# Patient Record
Sex: MALE | Race: WHITE | URBAN - METROPOLITAN AREA
[De-identification: names, ages, dates, MRNs, and addresses within clinical notes are randomized per-mention and may not be internally consistent; named-entity substitution may affect disease eponyms.]

---
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
## 2018-01-15 ENCOUNTER — TELEPHONE (OUTPATIENT)
Dept: FAMILY MEDICINE CLINIC | Facility: CLINIC | Age: 18
End: 2018-01-15

## 2018-01-15 NOTE — TELEPHONE ENCOUNTER
Patient called to make sure his appt was for 1pm today, however, his appt was already marked as a no -show as it was for 10:30am today not 1pm. I did reschedule his appt for 3pm tomorrow, Tuesday 1/16 and explained the office no show policy as well.  Daisy

## 2018-01-16 ENCOUNTER — OFFICE VISIT (OUTPATIENT)
Dept: FAMILY MEDICINE CLINIC | Facility: CLINIC | Age: 18
End: 2018-01-16

## 2018-01-16 VITALS
BODY MASS INDEX: 24.18 KG/M2 | HEIGHT: 69 IN | TEMPERATURE: 97 F | WEIGHT: 163.25 LBS | SYSTOLIC BLOOD PRESSURE: 128 MMHG | RESPIRATION RATE: 18 BRPM | DIASTOLIC BLOOD PRESSURE: 62 MMHG | HEART RATE: 80 BPM

## 2018-01-16 DIAGNOSIS — J45.20 MILD INTERMITTENT ASTHMA WITHOUT COMPLICATION: Primary | ICD-10-CM

## 2018-01-16 PROBLEM — J40 BRONCHITIS: Status: RESOLVED | Noted: 2017-11-17 | Resolved: 2018-01-16

## 2018-01-16 PROCEDURE — 99214 OFFICE O/P EST MOD 30 MIN: CPT | Performed by: FAMILY MEDICINE

## 2018-01-16 NOTE — PROGRESS NOTES
[address]  PROGRESS NOTE  Chief Complaint:   Patient presents with: Other: Pt needs medical history due enlisting      HPI:   This is a 21year old male coming in for review of his medical history.   He is interested in enlisting in the treatment and has had no subsequent problems associated with that. He did have BCG vaccine in [facility]. 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 No results found for this or any previous visit.     Past Medical History:   Diagnosis Date sputum. GASTROINTESTINAL:  Denies abdominal pain, nausea, vomiting, constipation, diarrhea, or blood in stool. MUSCULOSKELETAL:  Denies weakness, muscle aches, back pain, joint pain, swelling or stiffness.   NEUROLOGICAL:  Denies headache, seizures, dizzi negative, FROM. EXTREMITIES:  No edema, no cyanosis, no clubbing, FROM, 2+ dorsalis pedis pulses bilaterally. NEURO:  No deficit, normal gait, strength and tone, sensory intact, normal reflexes. ASSESSMENT AND PLAN:   1.  Mild intermittent asthma witho

## 2018-05-15 ENCOUNTER — OFFICE VISIT (OUTPATIENT)
Dept: FAMILY MEDICINE CLINIC | Facility: CLINIC | Age: 18
End: 2018-05-15

## 2018-05-15 VITALS
SYSTOLIC BLOOD PRESSURE: 108 MMHG | DIASTOLIC BLOOD PRESSURE: 64 MMHG | HEART RATE: 82 BPM | HEIGHT: 69 IN | BODY MASS INDEX: 22.13 KG/M2 | WEIGHT: 149.38 LBS | RESPIRATION RATE: 20 BRPM | TEMPERATURE: 97 F

## 2018-05-15 DIAGNOSIS — L08.9 INFECTED SEBACEOUS CYST: Primary | ICD-10-CM

## 2018-05-15 DIAGNOSIS — L72.3 INFECTED SEBACEOUS CYST: Primary | ICD-10-CM

## 2018-05-15 NOTE — PROGRESS NOTES
[facility] [place]  PROGRESS NOTE  Chief Complaint:   Patient presents with:  Bump: Underneath left armpit, painful      HPI:   This is a 19year old male coming in for a tender pimple-like bump on the underside of his left armpit.   He said it s throat.   INTEGUMENTARY: See HPI      EXAM:   /64 (BP Location: Right arm, Patient Position: Sitting, Cuff Size: adult)   Pulse 82   Temp 97.1 °F (36.2 °C) (Tympanic)   Resp 20   Ht 69\"   Wt 149 lb 6 oz   BMI 22.06 kg/m²  Estimated body mass index is MD  5/15/2018  2:53 PM

## 2018-06-05 ENCOUNTER — TELEPHONE (OUTPATIENT)
Dept: FAMILY MEDICINE CLINIC | Facility: CLINIC | Age: 18
End: 2018-06-05

## 2018-06-05 NOTE — TELEPHONE ENCOUNTER
Offered appt tomorrow 9:30 with Dr Дмитрий Martinez for cyst evaluation  And again on Saturday. Patient states He has to work both dates. States He will wait until 6/12.     Patient informed if Cysts become to bothersome before 6/12 appt  That He can go to [employer]

## 2018-06-12 ENCOUNTER — OFFICE VISIT (OUTPATIENT)
Dept: FAMILY MEDICINE CLINIC | Facility: CLINIC | Age: 18
End: 2018-06-12

## 2018-06-12 VITALS
TEMPERATURE: 97 F | HEIGHT: 69 IN | SYSTOLIC BLOOD PRESSURE: 120 MMHG | BODY MASS INDEX: 22.68 KG/M2 | HEART RATE: 76 BPM | RESPIRATION RATE: 18 BRPM | WEIGHT: 153.13 LBS | DIASTOLIC BLOOD PRESSURE: 78 MMHG

## 2018-06-12 DIAGNOSIS — L08.9 INFECTED SEBACEOUS CYST: Primary | ICD-10-CM

## 2018-06-12 DIAGNOSIS — L72.3 INFECTED SEBACEOUS CYST: Primary | ICD-10-CM

## 2018-06-12 PROCEDURE — 99213 OFFICE O/P EST LOW 20 MIN: CPT | Performed by: FAMILY MEDICINE

## 2018-06-12 NOTE — PROGRESS NOTES
[address]  PROGRESS NOTE  Chief Complaint:   Patient presents with:   Follow - Up: cyst under armpit - possible one near elbow      HPI:   This is a 19year old male coming in for follow-up on an infected sebaceous cyst in his left axill Ears, Nose, Throat:  Denies hearing loss, sneezing, congestion, runny nose or sore throat. INTEGUMENTARY:  Denies rashes, itching, skin lesion, or excessive skin dryness.   CARDIOVASCULAR:  Denies chest pain, chest pressure, chest discomfort, palpitations, there are 2 smaller sebaceous cysts that are very small and not tender or sore. They are not draining at this time. HEART:  Regular rate and rhythm, no murmurs, rubs or gallops. LUNGS: Clear to auscultation bilterally, no rales/rhonchi/wheezing.       AS

## 2018-06-15 ENCOUNTER — TELEPHONE (OUTPATIENT)
Dept: FAMILY MEDICINE CLINIC | Facility: CLINIC | Age: 18
End: 2018-06-15

## 2018-06-15 NOTE — TELEPHONE ENCOUNTER
Pt has ingrown hair was seen on 6/12 pt states it has gotten bigger and thinks its infected. Pt wanted appt today. I informed pt we have no appt today. Can schedule appt for tomorrow or can go to urgent care.

## 2018-06-15 NOTE — TELEPHONE ENCOUNTER
Left message for pt he can either drain the infected hair himself or  Call tomorrow and schedule appt to have it drained. Pt is not avail and wanted me to leave detail message.

## 2018-06-15 NOTE — TELEPHONE ENCOUNTER
He can drain this infected hair with either a razor blade or a needle. If not, he may come in tomorrow to see someone to have it drained.

## 2018-06-15 NOTE — TELEPHONE ENCOUNTER
R/C.   Patient will not be available til after 4:00, but said you can leave a detailed message on his voice mail.

## 2018-06-15 NOTE — TELEPHONE ENCOUNTER
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

## 2019-04-01 ENCOUNTER — TELEPHONE (OUTPATIENT)
Dept: FAMILY MEDICINE CLINIC | Facility: CLINIC | Age: 19
End: 2019-04-01

## 2019-04-01 NOTE — TELEPHONE ENCOUNTER
No showed for NP appointment - I left a message letting him know that he no showed for his appointment today and to call the office to reschedule. I also noted in the message that he would be charged a $40.00 no show fee for missing the appointment.

## 2019-08-24 ENCOUNTER — OFFICE VISIT (OUTPATIENT)
Dept: FAMILY MEDICINE CLINIC | Facility: CLINIC | Age: 19
End: 2019-08-24
Payer: COMMERCIAL

## 2019-08-24 ENCOUNTER — TELEPHONE (OUTPATIENT)
Dept: FAMILY MEDICINE CLINIC | Facility: CLINIC | Age: 19
End: 2019-08-24

## 2019-08-24 VITALS
HEART RATE: 70 BPM | HEIGHT: 69 IN | OXYGEN SATURATION: 97 % | DIASTOLIC BLOOD PRESSURE: 82 MMHG | BODY MASS INDEX: 22.81 KG/M2 | WEIGHT: 154 LBS | SYSTOLIC BLOOD PRESSURE: 140 MMHG | TEMPERATURE: 98 F

## 2019-08-24 DIAGNOSIS — F32.A DEPRESSION, UNSPECIFIED DEPRESSION TYPE: Primary | ICD-10-CM

## 2019-08-24 PROCEDURE — 99214 OFFICE O/P EST MOD 30 MIN: CPT | Performed by: NURSE PRACTITIONER

## 2019-08-24 NOTE — PROGRESS NOTES
Alice Flores is a 22year old male. Patient presents with: Anxiety      HPI:   Patient is here with complaints of depression.   States that he feels like he has had some depression his whole life–states he worries a lot, he has lost upon a lot of peo 6/4/2012   • Attention deficit disorder with hyperactivity 3/19/2010   • Nonspecific reaction to tuberculin skin test 6/7/2010   • Primary thrombocytopenia ([address] 7/17/2014   • Rickets, active 6/13/2005   • Tobacco use disorder 11/25/2013      Social Histor Prescriptions     Signed Prescriptions Disp Refills   • Sertraline HCl 50 MG Oral Tab 30 tablet 1     Sig: Take 1 tablet (50 mg total) by mouth daily. Imaging & Consults:  None    No follow-ups on file.   Patient Instructions   It is important to get

## 2019-08-24 NOTE — TELEPHONE ENCOUNTER
Patient states he is having Anxiety/Depression/Anger issues. States at work he will get upset/overwhelmed and will need to removed himself from situations. Wanting to discuss with someone today about counseling/medication.     Appt given 11:30 today w

## 2019-08-28 ENCOUNTER — TELEPHONE (OUTPATIENT)
Dept: FAMILY MEDICINE CLINIC | Facility: CLINIC | Age: 19
End: 2019-08-28

## 2019-08-28 PROBLEM — F41.1 GAD (GENERALIZED ANXIETY DISORDER): Status: ACTIVE | Noted: 2019-08-28

## 2019-08-28 NOTE — TELEPHONE ENCOUNTER
Patient states he started sertraline today. Is in the nurses office at work. States after taking the sertraline he feels he went into slowdown. Appt given today with  to discuss symtoms.   Isrrael Roblero, 08/28/19, 10:28 AM    Future appt:

## 2019-08-28 NOTE — TELEPHONE ENCOUNTER
Patient was put on Sertraline - was told it could possibly take up to 6 weeks for medication to kick in. Wants to know if it can kick in sooner/ or what can he do to make it kick in sooner.    OK to leave detailed message / answer on his voicemail since he

## 2019-10-07 RX ORDER — BUSPIRONE HYDROCHLORIDE 5 MG/1
5 TABLET ORAL 3 TIMES DAILY
Qty: 270 TABLET | Refills: 0 | Status: SHIPPED | OUTPATIENT
Start: 2019-10-07 | End: 2021-06-28

## 2019-10-08 ENCOUNTER — TELEPHONE (OUTPATIENT)
Dept: FAMILY MEDICINE CLINIC | Facility: CLINIC | Age: 19
End: 2019-10-08

## 2019-10-08 NOTE — TELEPHONE ENCOUNTER
was RF for Buspar sent into [facility] in [address] [place] [address] Street   ( this was requested on Monday 10/7 )  please advise

## 2019-10-21 ENCOUNTER — OFFICE VISIT (OUTPATIENT)
Dept: FAMILY MEDICINE CLINIC | Facility: CLINIC | Age: 19
End: 2019-10-21
Payer: COMMERCIAL

## 2019-10-21 VITALS
HEART RATE: 81 BPM | HEIGHT: 69 IN | TEMPERATURE: 98 F | WEIGHT: 164 LBS | OXYGEN SATURATION: 97 % | BODY MASS INDEX: 24.29 KG/M2 | DIASTOLIC BLOOD PRESSURE: 80 MMHG | RESPIRATION RATE: 18 BRPM | SYSTOLIC BLOOD PRESSURE: 136 MMHG

## 2019-10-21 DIAGNOSIS — Z11.3 SCREEN FOR STD (SEXUALLY TRANSMITTED DISEASE): Primary | ICD-10-CM

## 2019-10-21 PROCEDURE — 99213 OFFICE O/P EST LOW 20 MIN: CPT | Performed by: FAMILY MEDICINE

## 2019-10-21 NOTE — PROGRESS NOTES
[facility] [place]  PROGRESS NOTE  Chief Complaint:   Patient presents with:  STD      HPI:   This is a 22year old male presents to clinic for her STD screening. Patient currently does not have any symptoms.   Denies any history of STDs in the sinus pain or sore throat; hearing loss negative  INTEGUMENTARY:  Denies rashes, itching, skin lesion, or excessive skin dryness.   CARDIOVASCULAR:  Denies chest pain, chest pressure, chest discomfort, palpitations, edema, dyspnea on exertion or at rest.  R and all orders for this visit:    Screen for STD (sexually transmitted disease)  -     CHLAMYDIA/GONOCOCCUS, TERESE  -     T PALLIDUM SCREENING CASCADE  -     HIV AG AB COMBO  -     HEPATITIS B SURFACE ANTIBODY  -     HEPATITIS B SURFACE ANTIGEN  -     HCV AN

## 2019-10-21 NOTE — PATIENT INSTRUCTIONS
Check STD panel today. Discussed use of condoms on a regular basis. Return to clinic if any concern.

## 2020-02-24 ENCOUNTER — TELEPHONE (OUTPATIENT)
Dept: FAMILY MEDICINE CLINIC | Facility: CLINIC | Age: 20
End: 2020-02-24

## 2020-02-24 NOTE — TELEPHONE ENCOUNTER
Patient states he was at work today and went to bathroom for bm. States had quite a bit of blood on the toilet paper. Would like evaluation by .  has not openings today. Offered appt with Sharla VASQUEZ/P. Patient refused.     Advise

## 2020-04-21 ENCOUNTER — TELEPHONE (OUTPATIENT)
Dept: FAMILY MEDICINE CLINIC | Facility: CLINIC | Age: 20
End: 2020-04-21

## 2020-04-21 NOTE — TELEPHONE ENCOUNTER
Letter written specifying that Riwilmera Rack does have several risk factors for a more severe type of COVID infection.

## 2020-04-21 NOTE — TELEPHONE ENCOUNTER
works at [employer]  and needs note for work due to his high risk factors and they have had + COVID 19 cases

## 2020-04-21 NOTE — TELEPHONE ENCOUNTER
Patient states  advised patient not to work today and should remain home. Patient states he needs a letter to have on hand for his employer that he is at high risk for Covid19 due to:    IPP/ Antiphospholipid/ Asthma.     Patient is going to set up his

## 2020-04-22 ENCOUNTER — TELEPHONE (OUTPATIENT)
Dept: FAMILY MEDICINE CLINIC | Facility: CLINIC | Age: 20
End: 2020-04-22

## 2020-04-22 NOTE — TELEPHONE ENCOUNTER
Offered Video Appt today with . Patient stated can disregard phone message as he is going to wear a mask to work.     Roby Harman, 04/22/20, 12:56 PM

## 2020-06-30 ENCOUNTER — OFFICE VISIT (OUTPATIENT)
Dept: FAMILY MEDICINE CLINIC | Facility: CLINIC | Age: 20
End: 2020-06-30
Payer: COMMERCIAL

## 2020-06-30 VITALS
RESPIRATION RATE: 20 BRPM | OXYGEN SATURATION: 97 % | BODY MASS INDEX: 25.38 KG/M2 | WEIGHT: 171.38 LBS | DIASTOLIC BLOOD PRESSURE: 80 MMHG | HEART RATE: 80 BPM | TEMPERATURE: 97 F | HEIGHT: 69 IN | SYSTOLIC BLOOD PRESSURE: 120 MMHG
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
## 2020-06-30 DIAGNOSIS — T07.XXXA MULTIPLE ABRASIONS: ICD-10-CM

## 2020-06-30 PROCEDURE — 99213 OFFICE O/P EST LOW 20 MIN: CPT | Performed by: NURSE PRACTITIONER

## 2020-06-30 RX ORDER — HYDROCODONE BITARTRATE AND ACETAMINOPHEN 10; 325 MG/1; MG/1
1 TABLET ORAL EVERY 6 HOURS PRN
COMMUNITY
Start: 2020-06-25 | End: 2021-06-28

## 2020-06-30 NOTE — PATIENT INSTRUCTIONS
Check to see if you can find your immunization records  I do not see a recent tetanus. Also see if you finished Gardasil (HPV vaccine) -     Note given for work     Rest, ice, elevate, wrap, tylenol.   Cover abrasions with bacitracin ointment and bandag
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
## 2021-05-11 RX ORDER — ALBUTEROL SULFATE 90 UG/1
2 AEROSOL, METERED RESPIRATORY (INHALATION) AS NEEDED
Qty: 1 INHALER | Refills: 1 | Status: SHIPPED | OUTPATIENT
Start: 2021-05-11 | End: 2021-11-17

## 2021-05-11 NOTE — TELEPHONE ENCOUNTER
Future appt:    Last Appointment with provider:   Visit date not found  Last appointment at [facility]:  [facility] 6/2020      No results found for: CHOLEST, HDL, LDL, TRIGLY, TRIG  No results found for: EAG, A1C  No results found for: T4F, TSH, TSHT4    No

## 2021-05-11 NOTE — TELEPHONE ENCOUNTER
Pt calling for a refill on his albuterol inhaler to be sent to [facility] in [place]. Please advise.

## 2021-06-28 ENCOUNTER — OFFICE VISIT (OUTPATIENT)
Dept: FAMILY MEDICINE CLINIC | Facility: CLINIC | Age: 21
End: 2021-06-28
Payer: COMMERCIAL

## 2021-06-28 VITALS
DIASTOLIC BLOOD PRESSURE: 82 MMHG | OXYGEN SATURATION: 99 % | WEIGHT: 169 LBS | TEMPERATURE: 98 F | HEIGHT: 69 IN | HEART RATE: 87 BPM | SYSTOLIC BLOOD PRESSURE: 132 MMHG | BODY MASS INDEX: 25.03 KG/M2 | RESPIRATION RATE: 14 BRPM

## 2021-06-28 DIAGNOSIS — Z11.8 SCREENING FOR CHLAMYDIAL DISEASE: ICD-10-CM

## 2021-06-28 DIAGNOSIS — Z11.3 SCREENING FOR GONORRHEA: Primary | ICD-10-CM

## 2021-06-28 PROCEDURE — 3075F SYST BP GE 130 - 139MM HG: CPT | Performed by: NURSE PRACTITIONER

## 2021-06-28 PROCEDURE — 87591 N.GONORRHOEAE DNA AMP PROB: CPT | Performed by: NURSE PRACTITIONER

## 2021-06-28 PROCEDURE — 99213 OFFICE O/P EST LOW 20 MIN: CPT | Performed by: NURSE PRACTITIONER

## 2021-06-28 PROCEDURE — 87491 CHLMYD TRACH DNA AMP PROBE: CPT | Performed by: NURSE PRACTITIONER

## 2021-06-28 PROCEDURE — 3008F BODY MASS INDEX DOCD: CPT | Performed by: NURSE PRACTITIONER

## 2021-06-28 PROCEDURE — 3079F DIAST BP 80-89 MM HG: CPT | Performed by: NURSE PRACTITIONER

## 2021-06-28 RX ORDER — ALBUTEROL SULFATE 90 UG/1
2 AEROSOL, METERED RESPIRATORY (INHALATION) AS NEEDED
Qty: 1 EACH | Refills: 0 | Status: CANCELLED | OUTPATIENT
Start: 2021-06-28

## 2021-06-28 NOTE — PROGRESS NOTES
[facility] [place]  PROGRESS NOTE  Chief Complaint:   Patient presents with:  STD      HPI:   This is a 19year old male coming in for STD check. Patient here for gonorrhea and chlamydia check.   Patient reports his girlfriend was tested for b Denies hearing loss, sneezing, congestion, runny nose or sore throat. INTEGUMENTARY:  Denies rashes, itching, skin lesion, or excessive skin dryness.   CARDIOVASCULAR:  Denies chest pain, chest pressure, chest discomfort, palpitations, edema, dyspnea on e CHLAMYDIA/GONOCOCCUS, TERESE    2. Screening for chlamydial disease  Same as above. - CHLAMYDIA/GONOCOCCUS, TERESE      Patient/Caregiver Education: Patient/Caregiver Education: There are no barriers to learning. Medical education done.    Outcome: Patient verb

## 2021-06-28 NOTE — PATIENT INSTRUCTIONS
Urine specimen today, no sexua-l intercourse until results available and if positive both partners treated. Will await urine results, can take 48-72 hours to result.

## 2021-06-30 ENCOUNTER — TELEPHONE (OUTPATIENT)
Dept: FAMILY MEDICINE CLINIC | Facility: CLINIC | Age: 21
End: 2021-06-30

## 2021-06-30 DIAGNOSIS — A74.9 CHLAMYDIA INFECTION: Primary | ICD-10-CM

## 2021-06-30 RX ORDER — DOXYCYCLINE HYCLATE 100 MG/1
100 CAPSULE ORAL 2 TIMES DAILY
Qty: 20 CAPSULE | Refills: 0 | Status: SHIPPED | OUTPATIENT
Start: 2021-06-30 | End: 2021-07-10

## 2021-06-30 NOTE — TELEPHONE ENCOUNTER
----- Message from ZACKERY Gomez sent at 6/30/2021  9:16 AM CDT -----  Please call the patient with results. Patient positive for chlamydia on urine specimen. Please confirm patient pharmacy, indicated he may be travelling this week.   Will treat

## 2021-06-30 NOTE — TELEPHONE ENCOUNTER
Patient informed of the below results and recommendations. Patient would like prescription sent to [facility]. Patient will c/b to schedule a lab/nurse visit in 3 months. Form completed and faxed to [facility] as requested. No

## 2021-08-17 ENCOUNTER — TELEPHONE (OUTPATIENT)
Dept: FAMILY MEDICINE CLINIC | Facility: CLINIC | Age: 21
End: 2021-08-17

## 2021-11-01 ENCOUNTER — TELEPHONE (OUTPATIENT)
Dept: FAMILY MEDICINE CLINIC | Facility: CLINIC | Age: 21
End: 2021-11-01

## 2021-11-01 RX ORDER — BUSPIRONE HYDROCHLORIDE 5 MG/1
5 TABLET ORAL 3 TIMES DAILY
COMMUNITY
End: 2021-11-01

## 2021-11-01 NOTE — TELEPHONE ENCOUNTER
Patient states he restarted his Buspirone he has had since 2019. States he is feeling improvement of mood. Increased Anxiety/Depression since recent   Death of his father.     Patient is asking for  to send in  Rx for the Buspirone/Sertrali

## 2021-11-02 RX ORDER — BUSPIRONE HYDROCHLORIDE 5 MG/1
5 TABLET ORAL 3 TIMES DAILY
Qty: 270 TABLET | Refills: 0 | Status: SHIPPED | OUTPATIENT
Start: 2021-11-02 | End: 2021-11-17

## 2021-11-02 NOTE — TELEPHONE ENCOUNTER
Patient informed Rx sent to [facility]. Verbal permission given to call Mom to let know   Rx sent to [facility] [place]/done.   Dannie Guardado CMA, 11/02/21, 11:49 AM

## 2021-11-15 ENCOUNTER — TELEPHONE (OUTPATIENT)
Dept: FAMILY MEDICINE CLINIC | Facility: CLINIC | Age: 21
End: 2021-11-15

## 2021-11-15 NOTE — TELEPHONE ENCOUNTER
LM to RS no show. Informed pt of fee. \"Hello,    We see you missed your appointment that was scheduled for today. Just to let you know the system has charged you a $40.00 missed appointment fee.  As a reminder it is important to keep, cancel, or resc

## 2021-11-17 ENCOUNTER — OFFICE VISIT (OUTPATIENT)
Dept: FAMILY MEDICINE CLINIC | Facility: CLINIC | Age: 21
End: 2021-11-17
Payer: COMMERCIAL

## 2021-11-17 VITALS
TEMPERATURE: 99 F | HEIGHT: 69 IN | RESPIRATION RATE: 16 BRPM | WEIGHT: 169 LBS | SYSTOLIC BLOOD PRESSURE: 128 MMHG | HEART RATE: 95 BPM | DIASTOLIC BLOOD PRESSURE: 82 MMHG | BODY MASS INDEX: 25.03 KG/M2 | OXYGEN SATURATION: 97 %

## 2021-11-17 DIAGNOSIS — F41.1 GAD (GENERALIZED ANXIETY DISORDER): ICD-10-CM

## 2021-11-17 DIAGNOSIS — M25.571 CHRONIC PAIN OF RIGHT ANKLE: ICD-10-CM

## 2021-11-17 DIAGNOSIS — M25.50 ARTHRALGIA, UNSPECIFIED JOINT: ICD-10-CM

## 2021-11-17 DIAGNOSIS — R76.8 ANA POSITIVE: ICD-10-CM

## 2021-11-17 DIAGNOSIS — G89.29 CHRONIC PAIN OF RIGHT ANKLE: ICD-10-CM

## 2021-11-17 DIAGNOSIS — A74.9 CHLAMYDIA INFECTION: ICD-10-CM

## 2021-11-17 DIAGNOSIS — R06.2 WHEEZING: ICD-10-CM

## 2021-11-17 DIAGNOSIS — Z86.2 HISTORY OF ITP: Primary | ICD-10-CM

## 2021-11-17 PROCEDURE — 87591 N.GONORRHOEAE DNA AMP PROB: CPT | Performed by: NURSE PRACTITIONER

## 2021-11-17 PROCEDURE — 87491 CHLMYD TRACH DNA AMP PROBE: CPT | Performed by: NURSE PRACTITIONER

## 2021-11-17 PROCEDURE — 99215 OFFICE O/P EST HI 40 MIN: CPT | Performed by: NURSE PRACTITIONER

## 2021-11-17 PROCEDURE — 3008F BODY MASS INDEX DOCD: CPT | Performed by: NURSE PRACTITIONER

## 2021-11-17 PROCEDURE — 3074F SYST BP LT 130 MM HG: CPT | Performed by: NURSE PRACTITIONER

## 2021-11-17 PROCEDURE — 3079F DIAST BP 80-89 MM HG: CPT | Performed by: NURSE PRACTITIONER

## 2021-11-17 RX ORDER — BUSPIRONE HYDROCHLORIDE 5 MG/1
5 TABLET ORAL 3 TIMES DAILY
Qty: 270 TABLET | Refills: 0 | Status: SHIPPED | OUTPATIENT
Start: 2021-11-17 | End: 2022-01-31

## 2021-11-17 RX ORDER — ALBUTEROL SULFATE 90 UG/1
2 AEROSOL, METERED RESPIRATORY (INHALATION) EVERY 4 HOURS PRN
Qty: 1 EACH | Refills: 0 | Status: SHIPPED | OUTPATIENT
Start: 2021-11-17

## 2021-11-18 ENCOUNTER — TELEPHONE (OUTPATIENT)
Dept: FAMILY MEDICINE CLINIC | Facility: CLINIC | Age: 21
End: 2021-11-18

## 2021-11-18 NOTE — TELEPHONE ENCOUNTER
----- Message from ZACKERY Green sent at 11/18/2021  3:07 PM CST -----  Repeat urine for gonorrhea and chlamydia is negative.

## 2021-11-18 NOTE — PATIENT INSTRUCTIONS
Labs in next few days through [facility].    Refills of sertraline and buspirone, follow up in 3 months.     Physical therapy, [address] rehab for right ankle pain    Refill of albuterol for wheezing

## 2021-11-18 NOTE — PROGRESS NOTES
[facility] [place]  PROGRESS NOTE  Chief Complaint:   Patient presents with:  Chlamydia: Recheck Chlamydia   Joint Pain  Anxiety      HPI:   This is a 19year old male coming in for multiple symptoms. First, here for recheck on chlamydia.   No use: No    Social History    Social History Narrative      Not on file    Family History:  Family History   Family history unknown:  Yes     Allergies:    Latex                   RASH  Current Meds:  Current Outpatient Medications   Medication Sig Dispense cold or heat intolerance, polyuria or polydipsia. ALLERGIES:  Denies allergic response, history of asthma, sneezing, hives, eczema or rhinitis.      EXAM:   /82   Pulse 95   Temp 98.8 °F (37.1 °C) (Temporal)   Resp 16   Ht 5' 9\" (1.753 m)   Wt 169 l at this time  Declines counseling nor grief counseling  Medications managing symptoms currently, plan for recheck in 3-6 months pending lab results for other conditions  - busPIRone 5 MG Oral Tab; Take 1 tablet (5 mg total) by mouth 3 (three) times daily. Vaccine(1) due on 10/01/2021      Problem List:  Patient Active Problem List:     BEN positive     Asthma     Nonspecific reaction to tuberculin skin test     Tobacco use disorder     Contusion of neck     Infected sebaceous cyst     EDI (generalized anxie

## 2022-01-28 DIAGNOSIS — F41.1 GAD (GENERALIZED ANXIETY DISORDER): ICD-10-CM

## 2022-01-31 RX ORDER — BUSPIRONE HYDROCHLORIDE 5 MG/1
TABLET ORAL
Qty: 270 TABLET | Refills: 1 | Status: SHIPPED | OUTPATIENT
Start: 2022-01-31

## 2022-01-31 NOTE — TELEPHONE ENCOUNTER
Future appt:    Last Appointment with provider:   08/28/2019 - EDI  Last appointment at [facility]:  11/17/2021 - hx ITP - repeat labs in 3-6 months depending on results    Last px - never done      No results found for: CHOLEST, HDL, LDL, TRIGLY, TRIG

## 2022-09-10 ENCOUNTER — HOSPITAL ENCOUNTER (EMERGENCY)
Age: 22
Discharge: HOME | End: 2022-09-10

## 2022-09-10 VITALS
OXYGEN SATURATION: 98 % | HEIGHT: 70 IN | SYSTOLIC BLOOD PRESSURE: 146 MMHG | DIASTOLIC BLOOD PRESSURE: 84 MMHG | WEIGHT: 180 LBS | TEMPERATURE: 99 F | HEART RATE: 73 BPM | BODY MASS INDEX: 25.77 KG/M2 | RESPIRATION RATE: 20 BRPM

## 2022-09-10 DIAGNOSIS — R42 LIGHTHEADEDNESS: ICD-10-CM

## 2022-09-10 DIAGNOSIS — R42 DIZZINESS: ICD-10-CM

## 2022-09-10 DIAGNOSIS — R53.1 WEAKNESS GENERALIZED: Primary | ICD-10-CM

## 2022-09-10 DIAGNOSIS — F14.90 COCAINE USE: ICD-10-CM

## 2022-09-10 LAB
ALBUMIN SERPL-MCNC: 3.8 G/DL (ref 3.6–5.1)
ALBUMIN/GLOB SERPL: 1.7 {RATIO} (ref 1–2.4)
ALP SERPL-CCNC: 79 UNITS/L (ref 45–117)
ALT SERPL-CCNC: 39 UNITS/L
AMPHETAMINES UR QL SCN>500 NG/ML: NEGATIVE
ANION GAP SERPL CALC-SCNC: 13 MMOL/L (ref 7–19)
APPEARANCE UR: ABNORMAL
AST SERPL-CCNC: 17 UNITS/L
BACTERIA #/AREA URNS HPF: ABNORMAL /HPF
BARBITURATES UR QL SCN>200 NG/ML: NEGATIVE
BASOPHILS # BLD: 0 K/MCL (ref 0–0.3)
BASOPHILS NFR BLD: 0 %
BENZODIAZ UR QL SCN>200 NG/ML: NEGATIVE
BILIRUB SERPL-MCNC: 0.3 MG/DL (ref 0.2–1)
BILIRUB UR QL STRIP: NEGATIVE
BUN SERPL-MCNC: 13 MG/DL (ref 6–20)
BUN/CREAT SERPL: 14 (ref 7–25)
BZE UR QL SCN>150 NG/ML: POSITIVE
CALCIUM SERPL-MCNC: 8.7 MG/DL (ref 8.4–10.2)
CANNABINOIDS UR QL SCN>50 NG/ML: NEGATIVE
CHLORIDE SERPL-SCNC: 108 MMOL/L (ref 97–110)
CK SERPL-CCNC: 107 UNITS/L (ref 39–308)
CO2 SERPL-SCNC: 27 MMOL/L (ref 21–32)
COLOR UR: YELLOW
CREAT SERPL-MCNC: 0.95 MG/DL (ref 0.67–1.17)
DEPRECATED RDW RBC: 42.8 FL (ref 39–50)
EOSINOPHIL # BLD: 0 K/MCL (ref 0–0.5)
EOSINOPHIL NFR BLD: 1 %
ERYTHROCYTE [DISTWIDTH] IN BLOOD: 12.6 % (ref 11–15)
ETHANOL SERPL-MCNC: NORMAL MG/DL
FASTING DURATION TIME PATIENT: ABNORMAL H
GFR SERPLBLD BASED ON 1.73 SQ M-ARVRAT: >90 ML/MIN
GLOBULIN SER-MCNC: 2.2 G/DL (ref 2–4)
GLUCOSE SERPL-MCNC: 139 MG/DL (ref 70–99)
GLUCOSE UR STRIP-MCNC: NEGATIVE MG/DL
HCT VFR BLD CALC: 43 % (ref 39–51)
HGB BLD-MCNC: 14.5 G/DL (ref 13–17)
HGB UR QL STRIP: ABNORMAL
HYALINE CASTS #/AREA URNS LPF: ABNORMAL /LPF
IMM GRANULOCYTES # BLD AUTO: 0 K/MCL (ref 0–0.2)
IMM GRANULOCYTES # BLD: 1 %
KETONES UR STRIP-MCNC: NEGATIVE MG/DL
LEUKOCYTE ESTERASE UR QL STRIP: NEGATIVE
LYMPHOCYTES # BLD: 1.2 K/MCL (ref 1–4.8)
LYMPHOCYTES NFR BLD: 14 %
MCH RBC QN AUTO: 30.9 PG (ref 26–34)
MCHC RBC AUTO-ENTMCNC: 33.7 G/DL (ref 32–36.5)
MCV RBC AUTO: 91.5 FL (ref 78–100)
MONOCYTES # BLD: 0.6 K/MCL (ref 0.3–0.9)
MONOCYTES NFR BLD: 7 %
NEUTROPHILS # BLD: 6.5 K/MCL (ref 1.8–7.7)
NEUTROPHILS NFR BLD: 77 %
NITRITE UR QL STRIP: NEGATIVE
NRBC BLD MANUAL-RTO: 0 /100 WBC
OPIATES UR QL SCN>300 NG/ML: NEGATIVE
PCP UR QL SCN>25 NG/ML: NEGATIVE
PH UR STRIP: 7 [PH] (ref 5–7)
PLATELET # BLD AUTO: 168 K/MCL (ref 140–450)
POTASSIUM SERPL-SCNC: 3.7 MMOL/L (ref 3.4–5.1)
PROT SERPL-MCNC: 6 G/DL (ref 6.4–8.2)
PROT UR STRIP-MCNC: NEGATIVE MG/DL
RBC # BLD: 4.7 MIL/MCL (ref 4.5–5.9)
RBC #/AREA URNS HPF: ABNORMAL /HPF
SODIUM SERPL-SCNC: 144 MMOL/L (ref 135–145)
SP GR UR STRIP: 1.02 (ref 1–1.03)
SQUAMOUS #/AREA URNS HPF: ABNORMAL /HPF
TROPONIN I SERPL DL<=0.01 NG/ML-MCNC: 4 NG/L
UROBILINOGEN UR STRIP-MCNC: 0.2 MG/DL
WBC # BLD: 8.3 K/MCL (ref 4.2–11)
WBC #/AREA URNS HPF: ABNORMAL /HPF

## 2022-09-10 PROCEDURE — 81001 URINALYSIS AUTO W/SCOPE: CPT | Performed by: PHYSICIAN ASSISTANT

## 2022-09-10 PROCEDURE — 84484 ASSAY OF TROPONIN QUANT: CPT | Performed by: PHYSICIAN ASSISTANT

## 2022-09-10 PROCEDURE — 99284 EMERGENCY DEPT VISIT MOD MDM: CPT

## 2022-09-10 PROCEDURE — 80053 COMPREHEN METABOLIC PANEL: CPT | Performed by: PHYSICIAN ASSISTANT

## 2022-09-10 PROCEDURE — 85025 COMPLETE CBC W/AUTO DIFF WBC: CPT | Performed by: PHYSICIAN ASSISTANT

## 2022-09-10 PROCEDURE — 36415 COLL VENOUS BLD VENIPUNCTURE: CPT

## 2022-09-10 PROCEDURE — 80307 DRUG TEST PRSMV CHEM ANLYZR: CPT | Performed by: PHYSICIAN ASSISTANT

## 2022-09-10 PROCEDURE — 99284 EMERGENCY DEPT VISIT MOD MDM: CPT | Performed by: PHYSICIAN ASSISTANT

## 2022-09-10 PROCEDURE — 10002807 HB RX 258: Performed by: PHYSICIAN ASSISTANT

## 2022-09-10 PROCEDURE — 82077 ASSAY SPEC XCP UR&BREATH IA: CPT | Performed by: PHYSICIAN ASSISTANT

## 2022-09-10 PROCEDURE — 93005 ELECTROCARDIOGRAM TRACING: CPT | Performed by: PHYSICIAN ASSISTANT

## 2022-09-10 PROCEDURE — 93010 ELECTROCARDIOGRAM REPORT: CPT | Performed by: INTERNAL MEDICINE

## 2022-09-10 PROCEDURE — 96360 HYDRATION IV INFUSION INIT: CPT

## 2022-09-10 PROCEDURE — 82550 ASSAY OF CK (CPK): CPT | Performed by: PHYSICIAN ASSISTANT

## 2022-09-10 RX ORDER — SERTRALINE HYDROCHLORIDE 100 MG/1
100 TABLET, FILM COATED ORAL DAILY
COMMUNITY

## 2022-09-10 RX ORDER — BUSPIRONE HYDROCHLORIDE 30 MG/1
50 TABLET ORAL
COMMUNITY

## 2022-09-10 RX ADMIN — SODIUM CHLORIDE 1000 ML: 9 INJECTION, SOLUTION INTRAVENOUS at 15:16

## 2022-09-10 ASSESSMENT — ENCOUNTER SYMPTOMS
HEADACHES: 0
SEIZURES: 0
ABDOMINAL PAIN: 0
PHOTOPHOBIA: 0
SHORTNESS OF BREATH: 0
DIZZINESS: 1
COLOR CHANGE: 0
WEAKNESS: 1
VOMITING: 0
LIGHT-HEADEDNESS: 1
NAUSEA: 1
COUGH: 0
TREMORS: 0
CONFUSION: 0
SPEECH DIFFICULTY: 0
FACIAL ASYMMETRY: 0
NUMBNESS: 0

## 2022-09-10 ASSESSMENT — PAIN SCALES - GENERAL: PAINLEVEL_OUTOF10: 0

## 2022-09-16 LAB
ATRIAL RATE (BPM): 75
DIASTOLIC BLOOD PRESSURE: 79
P AXIS (DEGREES): 54
PR-INTERVAL (MSEC): 128
QRS-INTERVAL (MSEC): 92
QT-INTERVAL (MSEC): 364
QTC: 406
R AXIS (DEGREES): 87
REPORT TEXT: NORMAL
SYSTOLIC BLOOD PRESSURE: 138
T AXIS (DEGREES): 35
VENTRICULAR RATE EKG/MIN (BPM): 75

## 2022-12-22 ENCOUNTER — OFFICE VISIT (OUTPATIENT)
Dept: FAMILY MEDICINE CLINIC | Facility: CLINIC | Age: 22
End: 2022-12-22
Payer: COMMERCIAL

## 2022-12-22 VITALS
HEART RATE: 84 BPM | TEMPERATURE: 98 F | DIASTOLIC BLOOD PRESSURE: 88 MMHG | SYSTOLIC BLOOD PRESSURE: 128 MMHG | OXYGEN SATURATION: 99 % | RESPIRATION RATE: 20 BRPM

## 2022-12-22 DIAGNOSIS — J02.9 SORE THROAT: Primary | ICD-10-CM

## 2022-12-22 DIAGNOSIS — H10.33 ACUTE BACTERIAL CONJUNCTIVITIS OF BOTH EYES: ICD-10-CM

## 2022-12-22 DIAGNOSIS — R05.1 ACUTE COUGH: ICD-10-CM

## 2022-12-22 PROCEDURE — 3074F SYST BP LT 130 MM HG: CPT | Performed by: NURSE PRACTITIONER

## 2022-12-22 PROCEDURE — 99213 OFFICE O/P EST LOW 20 MIN: CPT | Performed by: NURSE PRACTITIONER

## 2022-12-22 PROCEDURE — 3079F DIAST BP 80-89 MM HG: CPT | Performed by: NURSE PRACTITIONER

## 2022-12-22 RX ORDER — TOBRAMYCIN 3 MG/ML
1 SOLUTION/ DROPS OPHTHALMIC EVERY 6 HOURS
Qty: 5 ML | Refills: 0 | Status: SHIPPED | OUTPATIENT
Start: 2022-12-22 | End: 2023-01-05

## 2022-12-22 RX ORDER — CEPHALEXIN 500 MG/1
500 CAPSULE ORAL 3 TIMES DAILY
Qty: 21 CAPSULE | Refills: 0 | Status: SHIPPED | OUTPATIENT
Start: 2022-12-22 | End: 2023-01-05 | Stop reason: ALTCHOICE

## 2022-12-22 NOTE — PATIENT INSTRUCTIONS
Use the drops as directed for 7 days    Take antibiotic three times a day for 7 days     Treat symptoms as needed. Follow up as needed.

## 2023-01-05 ENCOUNTER — TELEPHONE (OUTPATIENT)
Dept: FAMILY MEDICINE CLINIC | Facility: CLINIC | Age: 23
End: 2023-01-05

## 2023-01-05 ENCOUNTER — OFFICE VISIT (OUTPATIENT)
Dept: FAMILY MEDICINE CLINIC | Facility: CLINIC | Age: 23
End: 2023-01-05
Payer: COMMERCIAL

## 2023-01-05 VITALS
TEMPERATURE: 97 F | HEART RATE: 82 BPM | WEIGHT: 187.81 LBS | OXYGEN SATURATION: 97 % | RESPIRATION RATE: 18 BRPM | BODY MASS INDEX: 27.82 KG/M2 | SYSTOLIC BLOOD PRESSURE: 112 MMHG | HEIGHT: 69 IN | DIASTOLIC BLOOD PRESSURE: 82 MMHG

## 2023-01-05 DIAGNOSIS — J02.9 SORE THROAT: Primary | ICD-10-CM

## 2023-01-05 DIAGNOSIS — R05.1 ACUTE COUGH: ICD-10-CM

## 2023-01-05 DIAGNOSIS — H10.33 ACUTE BACTERIAL CONJUNCTIVITIS OF BOTH EYES: ICD-10-CM

## 2023-01-05 PROCEDURE — 3008F BODY MASS INDEX DOCD: CPT

## 2023-01-05 PROCEDURE — 3074F SYST BP LT 130 MM HG: CPT

## 2023-01-05 PROCEDURE — 99214 OFFICE O/P EST MOD 30 MIN: CPT

## 2023-01-05 PROCEDURE — 3079F DIAST BP 80-89 MM HG: CPT

## 2023-01-05 RX ORDER — TOBRAMYCIN 3 MG/ML
1 SOLUTION/ DROPS OPHTHALMIC EVERY 6 HOURS
Qty: 5 ML | Refills: 0 | Status: SHIPPED | OUTPATIENT
Start: 2023-01-05

## 2023-01-05 RX ORDER — CLINDAMYCIN HYDROCHLORIDE 300 MG/1
300 CAPSULE ORAL 3 TIMES DAILY
Qty: 10 CAPSULE | Refills: 0 | Status: SHIPPED | OUTPATIENT
Start: 2023-01-05

## 2023-01-05 RX ORDER — TOBRAMYCIN 3 MG/ML
2 SOLUTION/ DROPS OPHTHALMIC EVERY 4 HOURS
Qty: 1 EACH | Refills: 0 | Status: CANCELLED | OUTPATIENT
Start: 2023-01-05

## 2023-01-05 NOTE — TELEPHONE ENCOUNTER
Patient was seen in respiratory on 12/22/22. He states symptoms have improved but still experiencing sore throat and eye drainage/ redness in both eyes. Cough has greatly improved. Patient states he only took 5 days of antibiotics and stopped taking it when he had two days left. He is asking for a refill on both the Keflex and eye drops. Patient states the eye drops are completed and they helped his symptoms. He is not taking any OTC medication.

## 2023-01-12 ENCOUNTER — OFFICE VISIT (OUTPATIENT)
Dept: FAMILY MEDICINE CLINIC | Facility: CLINIC | Age: 23
End: 2023-01-12
Payer: COMMERCIAL

## 2023-01-12 VITALS
BODY MASS INDEX: 28.08 KG/M2 | DIASTOLIC BLOOD PRESSURE: 72 MMHG | OXYGEN SATURATION: 98 % | TEMPERATURE: 98 F | HEIGHT: 69 IN | RESPIRATION RATE: 16 BRPM | SYSTOLIC BLOOD PRESSURE: 116 MMHG | HEART RATE: 90 BPM | WEIGHT: 189.63 LBS

## 2023-01-12 DIAGNOSIS — Z20.2 EXPOSURE TO SEXUALLY TRANSMITTED DISEASE (STD): Primary | ICD-10-CM

## 2023-01-12 PROCEDURE — 99213 OFFICE O/P EST LOW 20 MIN: CPT

## 2023-01-12 PROCEDURE — 3008F BODY MASS INDEX DOCD: CPT

## 2023-01-12 PROCEDURE — 3074F SYST BP LT 130 MM HG: CPT

## 2023-01-12 PROCEDURE — 3078F DIAST BP <80 MM HG: CPT

## 2023-01-12 NOTE — PATIENT INSTRUCTIONS
STD check at [facility].    Use condom when sexually active. Continue current abx, we will send for another abx if STD positive.

## 2023-01-18 ENCOUNTER — LABORATORY ENCOUNTER (OUTPATIENT)
Dept: LAB | Age: 23
End: 2023-01-18
Payer: COMMERCIAL

## 2023-01-18 DIAGNOSIS — Z20.2 EXPOSURE TO STD: Primary | ICD-10-CM

## 2023-01-18 LAB
HBV SURFACE AB SER QL: NONREACTIVE
HBV SURFACE AB SERPL IA-ACNC: 5.89 MIU/ML
HBV SURFACE AG SER-ACNC: <0.1 [IU]/L
HBV SURFACE AG SERPL QL IA: NONREACTIVE
HCV AB SERPL QL IA: NONREACTIVE
T PALLIDUM AB SER QL IA: NONREACTIVE

## 2023-01-18 PROCEDURE — 87591 N.GONORRHOEAE DNA AMP PROB: CPT

## 2023-01-18 PROCEDURE — 86780 TREPONEMA PALLIDUM: CPT

## 2023-01-18 PROCEDURE — 87491 CHLMYD TRACH DNA AMP PROBE: CPT

## 2023-01-18 PROCEDURE — 87340 HEPATITIS B SURFACE AG IA: CPT

## 2023-01-18 PROCEDURE — 36415 COLL VENOUS BLD VENIPUNCTURE: CPT

## 2023-01-18 PROCEDURE — 87389 HIV-1 AG W/HIV-1&-2 AB AG IA: CPT

## 2023-01-18 PROCEDURE — 86803 HEPATITIS C AB TEST: CPT

## 2023-01-18 PROCEDURE — 86706 HEP B SURFACE ANTIBODY: CPT

## 2023-01-19 ENCOUNTER — TELEPHONE (OUTPATIENT)
Dept: FAMILY MEDICINE CLINIC | Facility: CLINIC | Age: 23
End: 2023-01-19

## 2023-01-19 LAB
C TRACH DNA SPEC QL NAA+PROBE: POSITIVE
N GONORRHOEA DNA SPEC QL NAA+PROBE: NEGATIVE

## 2023-01-19 RX ORDER — DOXYCYCLINE HYCLATE 100 MG/1
100 CAPSULE ORAL 2 TIMES DAILY
Qty: 14 CAPSULE | Refills: 0 | Status: SHIPPED | OUTPATIENT
Start: 2023-01-19 | End: 2023-01-26

## 2023-01-19 NOTE — TELEPHONE ENCOUNTER
Due to patient testing positive for chlamydia, will start doxycycline 100 mg twice daily for 7 days. Encourage safe sex recommendations. Always use a condom when having sex. Abstinence is the only intervention that is 100% percent effective against STDs and pregnancies. Advise abstinence for the next week and a half while he is on medication. I recommend letting his sexual partners know that he tested positive for chlamydia and recommend they test as well.     We will need to retest chlamydia testing in 3 to 9 months

## 2023-01-19 NOTE — TELEPHONE ENCOUNTER
Spoke with Delores Delcid from [facility] lab and confirmed the pt is positive for chlamydia.      Please advise

## 2023-01-19 NOTE — TELEPHONE ENCOUNTER
Pt wants his recent test results mailed to him,  Please mail to [address], [facility].

## (undated) NOTE — LETTER
01/10/22        Rosana Mark  [address] [facility]      Dear Val Matias,    [address] records indicate that you have outstanding lab work and or testing that was ordered for you and has not yet been completed:  Orders Placed This Encounter

## (undated) NOTE — LETTER
Date: 6/30/2020    Patient Name: Rachelle Andrews          To Whom it may concern: This letter has been written at the patient's request. The above patient was seen at [facility] for treatment of a medical condition.     This patient m

## (undated) NOTE — LETTER
20    Re:  Babar BOWSER 2000    Dear Dee Washington has been my patient for most of his life. He has several autoimmune diseases and a history of asthma.   Because of his past medical history he is at significantly increase